# Patient Record
Sex: MALE | Race: WHITE | NOT HISPANIC OR LATINO | URBAN - METROPOLITAN AREA
[De-identification: names, ages, dates, MRNs, and addresses within clinical notes are randomized per-mention and may not be internally consistent; named-entity substitution may affect disease eponyms.]

---

## 2024-04-26 ENCOUNTER — CONSULT (OUTPATIENT)
Dept: UROLOGY | Facility: CLINIC | Age: 39
End: 2024-04-26
Payer: COMMERCIAL

## 2024-04-26 VITALS
SYSTOLIC BLOOD PRESSURE: 132 MMHG | RESPIRATION RATE: 16 BRPM | DIASTOLIC BLOOD PRESSURE: 82 MMHG | HEART RATE: 86 BPM | TEMPERATURE: 97.5 F | BODY MASS INDEX: 32.47 KG/M2 | WEIGHT: 253 LBS | OXYGEN SATURATION: 98 % | HEIGHT: 74 IN

## 2024-04-26 DIAGNOSIS — Z30.09 ENCOUNTER FOR VASECTOMY COUNSELING: Primary | ICD-10-CM

## 2024-04-26 PROCEDURE — 99204 OFFICE O/P NEW MOD 45 MIN: CPT | Performed by: PHYSICIAN ASSISTANT

## 2024-04-26 RX ORDER — LOSARTAN POTASSIUM 25 MG/1
TABLET ORAL
COMMUNITY
Start: 2022-01-25

## 2024-04-26 RX ORDER — ERTAPENEM 1 G/1
INJECTION, POWDER, LYOPHILIZED, FOR SOLUTION INTRAMUSCULAR; INTRAVENOUS EVERY 24 HOURS
COMMUNITY
End: 2024-04-26 | Stop reason: ALTCHOICE

## 2024-04-26 RX ORDER — IBUPROFEN 600 MG/1
600 TABLET ORAL EVERY 6 HOURS PRN
COMMUNITY
Start: 2024-02-06 | End: 2024-04-26 | Stop reason: ALTCHOICE

## 2024-04-26 RX ORDER — DOCUSATE SODIUM 100 MG/1
CAPSULE, LIQUID FILLED ORAL EVERY 24 HOURS
COMMUNITY
End: 2024-04-26 | Stop reason: ALTCHOICE

## 2024-04-26 RX ORDER — ALPRAZOLAM 2 MG/1
TABLET ORAL
Qty: 1 TABLET | Refills: 0 | Status: SHIPPED | OUTPATIENT
Start: 2024-04-26

## 2024-04-26 RX ORDER — ENOXAPARIN SODIUM 100 MG/ML
INJECTION SUBCUTANEOUS EVERY 24 HOURS
COMMUNITY
End: 2024-04-26 | Stop reason: ALTCHOICE

## 2024-04-26 RX ORDER — AMOXICILLIN 500 MG/1
CAPSULE ORAL
COMMUNITY
Start: 2024-02-06 | End: 2024-04-26 | Stop reason: ALTCHOICE

## 2024-04-26 RX ORDER — ACETAMINOPHEN 325 MG/1
TABLET ORAL
COMMUNITY
End: 2024-04-26 | Stop reason: ALTCHOICE

## 2024-04-26 RX ORDER — LOSARTAN POTASSIUM 25 MG/1
TABLET ORAL EVERY 24 HOURS
COMMUNITY

## 2024-04-26 NOTE — PROGRESS NOTES
Referring Physician: Domenica Cameron  A copy of this consultation note was communicated to the referring physician.       Diagnoses and all orders for this visit:    Encounter for vasectomy counseling  -     ALPRAZolam (XANAX) 2 MG tablet; Take 1 tablet by mouth 1 hour prior vasectomy. Must have .  -     chlorhexidine (HIBICLENS) 4 % external liquid; Bathe with soap the night prior to and morning of vasectomy    Other orders  -     Discontinue: acetaminophen (Tylenol) 325 mg tablet; 6 (six) times a day  -     Discontinue: amoxicillin (AMOXIL) 500 mg capsule; take 1 capsule by mouth 3 times a day until finished  -     Discontinue: docusate sodium (Colace) 100 mg capsule; every 24 hours  -     Discontinue: enoxaparin (Lovenox) 40 mg/0.4 mL; every 24 hours  -     Discontinue: ibuprofen (MOTRIN) 600 mg tablet; Take 600 mg by mouth every 6 (six) hours as needed  -     losartan (COZAAR) 25 mg tablet  -     losartan (COZAAR) 25 mg tablet; every 24 hours  -     Discontinue: ertapenem (INVanz) 1 g; every 24 hours            Assessment and plan:       We had a long discussion regarding the options for birth control.  I told the patient that vasectomy is considered to be a permanent surgical sterilization procedure.  We spoke about other options including the possibility of vasectomy reversal at a later time.  He understands that vasectomy confers no immunity to STDs.  I also told him that according to our present knowledge, there is no causal relationship between vasectomy and subsequent development of prostate cancer or testicular cancer.  No change in libido erection or ejaculation.    We spoke about the potential complications.  The most common one in the short term is scrotal hematoma and infection, which rarely requires re-operation.  Additionally, he can react to the anesthetic, develop scrotal swelling, have pain or skin bruising.  We spoke about post procedure care to try to minimize this complication.  I also asked  him to refrain from aspirin or fish oil products and alcohol prior to the procedure.  The long-term complications include but are not limited to vasectomy failure by recanalization, chronic epididymal discomfort, pain, among other possibilities.    I described to him how this procedure is normally performed in an office setting and he understands that if anesthesia is desired, this can be performed for him in an outpatient operative setting.  Finally, he understands that following vasectomy, he’ll need to use other means of birth control until he’s had semen analyses that demonstrate the absence of sperm.  He understands it will be his responsibility to submit these semen specimens and call our office for the results. I told him again that recanalization is a small but real possibility, and if he is ever concerned about it he can submit another semen specimen for analysis.      After discussing the risks, benefits, possible complications and alternatives, informed consents were obtained.  Alprazolam and hibiclens wash were prescribed, and review dosing, adverse effects and timing of the procedure.  He will return in the near future for the procedure.            Chief Complaint     Desire for vasectomy      History of Present Illness     Anderson Rocha is a 39 y.o. male referred for evaluation of vasectomy.    He has 2 biological children.  He states that he and his partner have come to the mutual decision they do not desire any additional children.    He has no past urologic history. History of HTN, controlled    Detailed Urologic History     - please refer to HPI    Review of Systems     Review of Systems   Constitutional: Negative for activity change and fatigue.   HENT: Negative for congestion.    Eyes: Negative for visual disturbance.   Respiratory: Negative for shortness of breath and wheezing.    Cardiovascular: Negative for chest pain and leg swelling.   Gastrointestinal: Negative for abdominal pain.  "  Endocrine: Negative for polyuria.   Genitourinary: Negative for dysuria, flank pain, hematuria and urgency.   Musculoskeletal: Negative for back pain.   Allergic/Immunologic: Negative for immunocompromised state.   Neurological: Negative for dizziness and numbness.   Psychiatric/Behavioral: Negative for dysphoric mood.   All other systems reviewed and are negative.        Allergies     Not on File    Physical Exam     Physical Exam   Constitutional: He is oriented to person, place, and time. He appears well-developed and well-nourished. No distress.   HENT:   Head: Normocephalic and atraumatic.   Eyes: EOM are normal.   Neck: Normal range of motion.   Cardiovascular: Pulses normal  Pulmonary/Chest: Effort normal   Genitourinary:   Genitourinary Comments: Vas deferens are palpable bilaterally.   Musculoskeletal: Normal range of motion.   Neurological: He is alert and oriented to person, place, and time.   Skin: Skin is warm.   Psychiatric: He has a normal mood and affect. His behavior is normal.         Vital Signs  There were no vitals filed for this visit.      Current Medications     No current outpatient medications on file.      Active Problems     There is no problem list on file for this patient.        Past Medical History     No past medical history on file.      Surgical History     No past surgical history on file.      Family History     No family history on file.      Social History     Social History     Social History     Tobacco Use   Smoking Status Not on file   Smokeless Tobacco Not on file       Portions of the record may have been created with voice recognition software.  Occasional wrong word or \"sound a like\" substitutions may have occurred due to the inherent limitations of voice recognition software.  Read the chart carefully and recognize, using context, where substitutions have occurred.      "

## 2024-11-07 DIAGNOSIS — Z30.09 ENCOUNTER FOR VASECTOMY COUNSELING: ICD-10-CM

## 2024-11-07 RX ORDER — CHLORHEXIDINE GLUCONATE 40 MG/ML
SOLUTION TOPICAL
Qty: 1 ML | Refills: 0 | Status: SHIPPED | OUTPATIENT
Start: 2024-11-07

## 2024-11-07 RX ORDER — ALPRAZOLAM 2 MG/1
TABLET ORAL
Qty: 1 TABLET | Refills: 0 | Status: SHIPPED | OUTPATIENT
Start: 2024-11-07

## 2024-11-07 NOTE — TELEPHONE ENCOUNTER
Patient calling to confirm appointment for vasectomy on Monday and clarify prep    Patient never pickup Rx for Chlorhexidine and Xanax. He is aware he needs a     Please resend Rx's to [facility] on file

## 2024-11-11 ENCOUNTER — PROCEDURE VISIT (OUTPATIENT)
Dept: UROLOGY | Facility: CLINIC | Age: 39
End: 2024-11-11
Payer: COMMERCIAL

## 2024-11-11 VITALS
DIASTOLIC BLOOD PRESSURE: 70 MMHG | BODY MASS INDEX: 31.32 KG/M2 | HEIGHT: 74 IN | OXYGEN SATURATION: 97 % | SYSTOLIC BLOOD PRESSURE: 116 MMHG | WEIGHT: 244 LBS | HEART RATE: 71 BPM | TEMPERATURE: 97.6 F

## 2024-11-11 DIAGNOSIS — Z30.2 ENCOUNTER FOR VASECTOMY: Primary | ICD-10-CM

## 2024-11-11 PROCEDURE — 55250 REMOVAL OF SPERM DUCT(S): CPT | Performed by: UROLOGY

## 2024-11-11 PROCEDURE — 88302 TISSUE EXAM BY PATHOLOGIST: CPT | Performed by: PATHOLOGY

## 2024-11-11 NOTE — PATIENT INSTRUCTIONS
POST VASECTOMY EXPECTATIONS    You are not sterile immediately following the procedure. You will need to have 2 semen analysis tests after the appropriate interval. You will need to use contraceptive protection until you have been cleared by the doctor.     You will need to continually ice the area for the first 24 hours following the procedure, even at bedtime. The second day you will need to ice every 3 hours for approximately 15-30 minutes.     You will need to wear tight briefs for support. If you have a job requiring you to stand for prolonged periods of time, we do recommend wearing a jock strap on the outside of your underwear.     If you need to use pain medication, do not mix with alcoholic beverages. You may use Advil or Tylenol for discomfort in lieu of the prescribed pain medication.     You may shower in 24 hours.    You may resume sexual activity in 72 hours.    We recommend that you do not take a bath, swim or soak in a hot tub for at least 7-10 days - preferably not before your post-op visit.     You may return to work in 48-72 hours unless otherwise advised. If necessary, a work note can be given.     You should not lift anything over 5-10lbs for the next 72 hours.     You should monitor your temperature once a day for a week. Call if you have any fever/chills, or if there is irregular drainage noted from the incision site.       POST VASECTOMY SPECIMEN COLLECTION  PURPOSE:   Patients are not immediately sterile following vasectomy. There is significant variation in the time necessary to clear previously produced sperm, therefore follow-up testing is necessary to assure sterility.  INTRUCTIONS:  Confirmation of sterility requires semen analysis.   The sample can be submitted to the lab 8 weeks post procedure.    COLLECTION TIPS:  You should have at least 15 ejaculations prior to submitting the first sample.  The specimen should be delivered to the lab within 1 hour of collection.  You may use  masturbation to collect specimen.  Do not have any ejaculations 2 days before providing sample.  Keep the specimen close to body temperature.    Please call the office for results 1 week after specimen submission for clearance or further instructions. Be sure to use an alternative form of birth control until instructed by office.     All testing through [facility] lab must be scheduled, in advance, with Central Scheduling, [phone]

## 2024-11-11 NOTE — PROGRESS NOTES
"    Vasectomy     Date/Time  11/11/2024 8:30 AM     Performed by  Phillip Canales DO   Authorized by  Phillip Canales DO     Universal Protocol   procedure performed by consultantConsent: Verbal consent obtained. Written consent obtained.  Risks and benefits: risks, benefits and alternatives were discussed  Consent given by: patient  Time out: Immediately prior to procedure a \"time out\" was called to verify the correct patient, procedure, equipment, support staff and site/side marked as required.  Timeout called at: 11/11/2024 9:32 AM.  Patient understanding: patient states understanding of the procedure being performed  Patient consent: the patient's understanding of the procedure matches consent given  Procedure consent: procedure consent matches procedure scheduled  Relevant documents: relevant documents present and verified  Required items: required blood products, implants, devices, and special equipment available  Patient identity confirmed: verbally with patient      Local anesthesia used: yes      Anesthesia: local infiltration     Anesthesia   Local anesthesia used: yes  Local Anesthetic: lidocaine 2% without epinephrine  Anesthetic total: 10 mL     Sedation   Patient sedated: no        Specimen: yes (BL vas)    Culture: no   Procedure Details   Procedure Notes: Procedures      No Scalpel Vasectomy Procedure Note    Indications: 39 y.o.  y.o. male desiring permanent sterilization    Pre-operative Diagnosis: Undesired fertility    Post-operative Diagnosis: Undesired fertility    Anesthesia: Lidocaine 2% without epinephrine      Procedure Details       Risks and benefits of vasectomy were previously discussed. Informed consent was obtained at his prior office visit. The patient had taken a benzodiazepine as prescribed for anxiolysis and he had showered the morning of the procedure and clipped excess pubic hair.  Music of the patient's choosing was also played for additional anxiolysis.    The " patient was placed in the supine position. His genitalia were prepped and draped in the usual sterile fashion.     The left vas deferens was grasped and presented to the area of interest on the left hemiscrotum. Next, 2% lidocaine was used to anesthetize the skin, subcutaneous tissue, and left vas deferens. The left vas deferens was grasped and presented into the surgical field with a vas clamp.   A #15 blade was used to make a longitudinal incision in the sheath of the vas deferens. The vas itself was then dissected free from the surrounding tissue. Clamps were placed proximally and distally and a 1 cm segment of the vas deferens was excised. Silk ties were applied and the exposed ends were fulgurated as was the lumen of the vas. Hemostasis was excellent. The vas was returned to its natural anatomic position after ensuring meticulous hemostasis.  A single stitch of 3-0 chromic was placed through the skin and dartos to reapproximate the defect in a horizontal mattress fashion.    The right vas deferens was then grasped and presented to the area of interest on the right hemiscrotum. Next, 2% lidocaine was used to anesthetize the skin, subcutaneous tissue, and right vas deferens. The right vas deferens was grasped and presented into the surgical field with a vas clamp.   A #15 blade was used to make a longitudinal incision in the sheath of the vas deferens. The vas itself was then dissected free from the surrounding tissue. Clamps were placed proximally and distally and a 1 cm segment of the vas deferens was excised. Silk ties were applied and the exposed ends were fulgurated as was the lumen of the vas. Hemostasis was excellent. The vas was returned to its natural anatomic position after ensuring meticulous hemostasis.  A single stitch of 3-0 chromic was placed through the skin and dartos to reapproximate the defect in a horizontal mattress fashion.        Specimen:   1.  LEFT deferens  2.  RIGHT vas  deferens    Condition: Stable    Complications: none    Plan:      He did very well with the procedure today. Postprocedural instructions were provided. He will follow-up PRN. He will continue to use any form of birth control that he is currently using until his sterility is confirmed              Patient Transportation: confirmed  Patient tolerance: patient tolerated the procedure well with no immediate complications

## 2024-11-15 PROCEDURE — 88302 TISSUE EXAM BY PATHOLOGIST: CPT | Performed by: PATHOLOGY
